# Patient Record
Sex: FEMALE | Race: WHITE
[De-identification: names, ages, dates, MRNs, and addresses within clinical notes are randomized per-mention and may not be internally consistent; named-entity substitution may affect disease eponyms.]

---

## 2023-03-17 ENCOUNTER — NON-APPOINTMENT (OUTPATIENT)
Age: 73
End: 2023-03-17

## 2024-09-15 ENCOUNTER — EMERGENCY (EMERGENCY)
Facility: HOSPITAL | Age: 74
LOS: 0 days | Discharge: HOME | End: 2024-09-15
Attending: EMERGENCY MEDICINE
Payer: MEDICARE

## 2024-09-15 VITALS
HEART RATE: 86 BPM | WEIGHT: 128.09 LBS | TEMPERATURE: 98 F | HEIGHT: 61 IN | OXYGEN SATURATION: 100 % | DIASTOLIC BLOOD PRESSURE: 92 MMHG | RESPIRATION RATE: 18 BRPM | SYSTOLIC BLOOD PRESSURE: 149 MMHG

## 2024-09-15 DIAGNOSIS — M54.50 LOW BACK PAIN, UNSPECIFIED: ICD-10-CM
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
## 2024-09-15 PROCEDURE — 99284 EMERGENCY DEPT VISIT MOD MDM: CPT | Mod: FS

## 2024-09-15 PROCEDURE — 99283 EMERGENCY DEPT VISIT LOW MDM: CPT

## 2024-09-15 RX ORDER — LIDOCAINE/BENZALKONIUM/ALCOHOL
1 SOLUTION, NON-ORAL TOPICAL
Qty: 5 | Refills: 0
Start: 2024-09-15 | End: 2024-09-21

## 2024-09-15 RX ORDER — ACETAMINOPHEN 325 MG/1
975 TABLET ORAL ONCE
Refills: 0 | Status: COMPLETED | OUTPATIENT
Start: 2024-09-15 | End: 2024-09-15

## 2024-09-15 RX ORDER — IBUPROFEN 600 MG
600 TABLET ORAL ONCE
Refills: 0 | Status: COMPLETED | OUTPATIENT
Start: 2024-09-15 | End: 2024-09-15

## 2024-09-15 RX ORDER — LIDOCAINE/BENZALKONIUM/ALCOHOL
1 SOLUTION, NON-ORAL TOPICAL ONCE
Refills: 0 | Status: COMPLETED | OUTPATIENT
Start: 2024-09-15 | End: 2024-09-15

## 2024-09-15 RX ADMIN — Medication 600 MILLIGRAM(S): at 11:40

## 2024-09-15 RX ADMIN — Medication 1 PATCH: at 11:40

## 2024-09-15 RX ADMIN — ACETAMINOPHEN 975 MILLIGRAM(S): 325 TABLET ORAL at 11:41

## 2024-09-15 NOTE — ED ADULT TRIAGE NOTE - MODE OF ARRIVAL
----- Message from Ami Robison sent at 12/19/2017  1:22 PM EST -----  Contact: LOTT  PATIENT REQUESTING A REFILL:    ADDERALL 30 MG   Walk in

## 2024-09-15 NOTE — ED PROVIDER NOTE - PHYSICAL EXAMINATION
CONST: Well appearing in NAD  EYES: PERRL, EOMI, Sclera and conjunctiva clear.   ENT: Oropharynx normal appearing, no erythema or exudates. Uvula midline.  CARD: Normal S1 S2; Normal rate and rhythm  RESP: Equal BS B/L, No wheezes, rhonchi or rales. No distress  GI: Soft, non-tender, non-distended.  MS: Normal ROM in all extremities. No midline spinal tenderness. R sided lumbosacral tenderness.   SKIN: Warm, dry, no acute rashes.   NEURO: A&Ox3, No focal deficits. Strength 5/5 with no sensory deficits. Steady gait

## 2024-09-15 NOTE — ED PROVIDER NOTE - OBJECTIVE STATEMENT
74-year-old female presents to the ED for evaluation.  Patient with mechanical nonsyncopal fall from standing 5 days ago fell onto the left side.  Had x-rays done at [facility] per patient was negative.  Patient complaining of right sided lumbosacral back pain worse with movement.  Has been taking 200 mg of ibuprofen without improvement.  Was concerned that her kidneys and may be injured prompting today's ED eval.  Denies any hematuria, nausea, vomiting, abdominal pain, numbness, tingling, weakness.

## 2024-09-15 NOTE — ED PROVIDER NOTE - ATTENDING APP SHARED VISIT CONTRIBUTION OF CARE
74-year-old female past medical history of chronic tremors, presents with fall 5 days ago and pain to right lower back.  Patient states she got up quickly and lost balance tripped landed on her back.  Patient states she was seen at urgent care and had x-rays done that were negative.  Symptoms worse with movement.  Taking 200 mg of ibuprofen with some relief.  No numbness weakness blurry vision slurred speech altered mental status.  No head injury or LOC.  No incontinence or retention.  No chest pain shortness of breath.  No abdominal pain nausea by diarrhea.  No blood thinners.  No dysuria frequency hematuria.  Patient concerned about her kidneys so came to ED.    On exam, AFVSS, Well appearing, No acute distress, NCAT, EOMI, PERRLA, MMM, Neck supple, LCTAB, RRR nl s1s2 No mrg, Abdomen Soft NTND, no CVA tenderness, aaox3, CN 2-12 intact, No nystagmus.  5/5 motor x 4 ext, SILT x 4 extremities, No facial droop or slurred speech. No pronator drift.  Normal rapid alternating movement and finger nose finger bilaterally. No midline C/T/L tenderness to palpation or step off. Normal gait, No ataxia.  Paraspinal right lower lumbar tenderness, no bruising or crepitus,, No LE edema or calf TTP,    A/P; musculoskeletal back pain neurovascular intact ambulatory in ED no red flags, improved with meds given in ED, DC home follow-up with PMD as outpatient 1 to 2 weeks, strict return precautions

## 2024-09-15 NOTE — ED ADULT NURSE NOTE - CCCP TRG CHIEF CMPLNT
Please notify patient: Vitamin D is still low, I already refilled the high dosage vitamin D to take weekly, with food    Insulin level is within normal limits  Thyroid function normal  FSH is normal, not at menopause.   Lipids within normal limits    Future Appointments  8/17/2022  9:00 AM    [facility] ULTRASOUND RM 1        [facility] US             [facility] Radiolog
fall

## 2024-09-15 NOTE — ED PROVIDER NOTE - PATIENT PORTAL LINK FT
You can access the FollowMyHealth Patient Portal offered by [facility] by registering at the following website: [email or web address]. By joining [employer]’s FollowMyHealth portal, you will also be able to view your health information using other applications (apps) compatible with our system.

## 2024-09-15 NOTE — ED ADULT NURSE NOTE - NSFALLRISKINTERV_ED_ALL_ED

## 2025-07-10 ENCOUNTER — EMERGENCY (EMERGENCY)
Facility: HOSPITAL | Age: 75
LOS: 0 days | Discharge: HOME | End: 2025-07-11
Attending: EMERGENCY MEDICINE
Payer: MEDICARE

## 2025-07-10 VITALS
DIASTOLIC BLOOD PRESSURE: 92 MMHG | HEIGHT: 61 IN | RESPIRATION RATE: 18 BRPM | SYSTOLIC BLOOD PRESSURE: 157 MMHG | OXYGEN SATURATION: 96 % | WEIGHT: 115.08 LBS | HEART RATE: 67 BPM | TEMPERATURE: 98 F

## 2025-07-10 PROCEDURE — 72125 CT NECK SPINE W/O DYE: CPT

## 2025-07-10 PROCEDURE — 70450 CT HEAD/BRAIN W/O DYE: CPT

## 2025-07-10 PROCEDURE — 99284 EMERGENCY DEPT VISIT MOD MDM: CPT | Mod: 25

## 2025-07-10 PROCEDURE — 99285 EMERGENCY DEPT VISIT HI MDM: CPT | Mod: FS

## 2025-07-10 RX ORDER — ACETAMINOPHEN 500 MG/5ML
650 LIQUID (ML) ORAL ONCE
Refills: 0 | Status: COMPLETED | OUTPATIENT
Start: 2025-07-10 | End: 2025-07-10

## 2025-07-10 RX ADMIN — Medication 650 MILLIGRAM(S): at 23:46

## 2025-07-10 NOTE — ED ADULT TRIAGE NOTE - CHIEF COMPLAINT QUOTE
The right coronary artery was selectively engaged and injected.  Pt c/o a mechanical fall down one stair immediately PTA. Pt states she hit the back of her head during the fall. +HT, -LOC, -AC.

## 2025-07-10 NOTE — ED ADULT NURSE NOTE - NSFALLHARMRISKINTERV_ED_ALL_ED

## 2025-07-10 NOTE — ED ADULT NURSE NOTE - CHIEF COMPLAINT QUOTE
Pt c/o a mechanical fall down one stair immediately PTA. Pt states she hit the back of her head during the fall. +HT, -LOC, -AC.

## 2025-07-11 PROCEDURE — 72125 CT NECK SPINE W/O DYE: CPT | Mod: 26

## 2025-07-11 PROCEDURE — 70450 CT HEAD/BRAIN W/O DYE: CPT | Mod: 26

## 2025-07-11 NOTE — ED PROVIDER NOTE - CARE PROVIDER_API CALL
Gianni Burr  Neurosurgery  [address] [place] [id number]  Phone: [phone]  Fax: [phone]  Follow Up Time:     Gretchen London  Neurological Surgery  [address] [place] [address], [address]  [place] [address]  Phone: [phone]  Fax: [phone]  Follow Up Time:

## 2025-07-11 NOTE — ED PROVIDER NOTE - CARE PROVIDERS DIRECT ADDRESSES
[email or web address]

## 2025-07-11 NOTE — ED PROVIDER NOTE - ATTENDING APP SHARED VISIT CONTRIBUTION OF CARE
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  Patient reports that she was walking upstairs and then decided to go downstairs to the bathroom and was walking backwards when she missed her footing on the left leg and fell hitting her posterior head.  Denies LOC.  Her daughter helped her eye.  Ambulatory since then.  Complaining of worsening neck pain and head pain at the site of the lump.  VSS, non toxic appearing, NAD, Head NCAT except for small hematoma, MMM, neck supple, with no midline tenderness to palpation, has some pressure marks on the right side of the neck, normal ROM, normal s1s2, lungs ctab, abd s/nt/nd, no guarding or rebound, extremities wnl, AAO x 3, GCS 15, neuro grossly normal. No acute skin lesions. Plan is head and neck imaging, analgesia and reassess.

## 2025-07-11 NOTE — ED PROVIDER NOTE - OBJECTIVE STATEMENT
74-year-old female history of essential tremor presenting to ER status post fall. patient's daughter states had mechanical trip and fall down 1 step fell backwards and hit back of head.  No LOC or anticoagulant use.  Patient has been ambulating at baseline since fall. + mild neck pain and pain to back of head. She denies any  other acute complaints in ER.  No headache, neck pain, nausea, vomiting, dizziness, unsteady gait, weakness, recent illness.

## 2025-07-11 NOTE — ED PROVIDER NOTE - CLINICAL SUMMARY MEDICAL DECISION MAKING FREE TEXT BOX
Patient presented after mechanical fall.  Required images and meds.  Was found to have spinous process fractures.  Neurosurgery consulted and they cleared patient for discharge with outpatient follow-up.

## 2025-07-11 NOTE — CONSULT NOTE ADULT - SUBJECTIVE AND OBJECTIVE BOX
Neurosurgery Consultation Note    74F PMH essential tremors s/p fall down x1 step,  Pt had a backyards fall down x1 step, hitting the back of her head, Pt remembers the entire event, no LOC no AC, since the fall pt has ambulated without difficulty In ED found with C4-6 SP Fx CTH negative.           Subjective: 74yFemale with a pmhx of MEWS Score    FALL AND HIT HEAD    90+    SysAdmin_VisitLink      s/p     Allergies    No Known Allergies    Intolerances        Vital Signs Last 24 Hrs  T(C): 36.7 (10 Jul 2025 22:36), Max: 36.7 (10 Jul 2025 22:36)  T(F): 98 (10 Jul 2025 22:36), Max: 98 (10 Jul 2025 22:36)  HR: 67 (10 Jul 2025 22:36) (67 - 67)  BP: 157/92 (10 Jul 2025 22:36) (157/92 - 157/92)  BP(mean): --  RR: 18 (10 Jul 2025 22:36) (18 - 18)  SpO2: 96% (10 Jul 2025 22:36) (96% - 96%)              REVIEW OF SYSTEMS    [ ] A ten-point review of systems was otherwise negative except as noted.  [ ] Due to altered mental status/intubation, subjective information were not able to be obtained from the patient. History was obtained, to the extent possible, from review of the chart and collateral sources of information.      Exam:  AAOX3. Verbal function intact  tongue midline, facial motions symmetric  PERRLA, EOMI  Pronator Drift:   Finger to Nose intact  Motor: MAEx4, 5/5 power in b/l UE and LE  Sensation: intact to touch in all extremities                        Wound:    Imaging:  < from: CT Head No Cont (07.11.25 @ 01:03) >    Impression:  CT cervical spine:  Acute displaced and nondisplaced C4-C6 spinous process fractures.  CT head:  No evidence of acute intracranial abnormality.    --- End of Report ---            WILFREDO SAUCEDO MD; Attending Radiologist  This document has been electronically signed. Jul 11 2025  1:41AM    < end of copied text >      Assessment/Plan:     74F PMH essential tremors s/p fall down x1 step found wiht C4-6 SP fx    Images reviewed, no acute neurosurgical intervention   Stable exam  Soft C collar PRN comfort  Pt may follow up with neurosurgery outpt       Neurosurgery Consultation Note    74F PMH essential tremors s/p fall down x1 step,  Pt had a backyards fall down x1 step, hitting the back of her head, Pt remembers the entire event, no LOC no AC, since the fall pt has ambulated without difficulty In ED found with C4-6 SP Fx CTH negative.  Pt seen and examined at the bedside, at present time pt is sitting up in bed, pt demonstrates an intact exam, PANIAGUA x4 SILT, w/o complaints, no C/T/L spinal TTP.          Subjective: 74yFemale with a pmhx of MEWS Score    FALL AND HIT HEAD    90+    SysAdmin_VisitLink      s/p     Allergies    No Known Allergies    Intolerances        Vital Signs Last 24 Hrs  T(C): 36.7 (10 Jul 2025 22:36), Max: 36.7 (10 Jul 2025 22:36)  T(F): 98 (10 Jul 2025 22:36), Max: 98 (10 Jul 2025 22:36)  HR: 67 (10 Jul 2025 22:36) (67 - 67)  BP: 157/92 (10 Jul 2025 22:36) (157/92 - 157/92)  BP(mean): --  RR: 18 (10 Jul 2025 22:36) (18 - 18)  SpO2: 96% (10 Jul 2025 22:36) (96% - 96%)              REVIEW OF SYSTEMS    [ ] A ten-point review of systems was otherwise negative except as noted.  [ ] Due to altered mental status/intubation, subjective information were not able to be obtained from the patient. History was obtained, to the extent possible, from review of the chart and collateral sources of information.      Exam:  AAOX3. Verbal function intact  tongue midline, facial motions symmetric  PERRLA, EOMI  Pronator Drift:   Finger to Nose intact  Motor: MAEx4, 5/5 power in b/l UE and LE  Sensation: intact to touch in all extremities                        Wound:    Imaging:  < from: CT Head No Cont (07.11.25 @ 01:03) >    Impression:  CT cervical spine:  Acute displaced and nondisplaced C4-C6 spinous process fractures.  CT head:  No evidence of acute intracranial abnormality.    --- End of Report ---            WILFREDO SAUCEDO MD; Attending Radiologist  This document has been electronically signed. Jul 11 2025  1:41AM    < end of copied text >      Assessment/Plan:     74F PMH essential tremors s/p fall down x1 step found with C4-6 SP fx    Images reviewed, no acute neurosurgical intervention   Stable exam  Pain control  Soft C collar PRN comfort  Pt may follow up PRN with established neurosurgeon Dr Gianni Burr ([facility])      Plan and care d/w ED team, pt and family updated

## 2025-07-11 NOTE — ED PROVIDER NOTE - NSFOLLOWUPINSTRUCTIONS_ED_ALL_ED_FT
Spinous Process Fracture    WHAT YOU NEED TO KNOW:    What is a spinous process fracture? A spinous process fracture is a break or crack in the back part of a vertebra. Muscles and ligaments in your back are attached to this part of the vertebra. When a vertebra is damaged, the spinal cord may also be damaged. Most spinous process fractures do not cause spinal cord damage.  Spinous Process Fracture    What increases my risk for a spinous process fracture?    Older age    Osteoporosis or low muscle mass    Not wearing a seatbelt in a car accident    Contact sports or a sport that causes you to twist your body    Shoveling something heavy, such as snow or rocks  What are the signs and symptoms of a spinous process fracture? Signs and symptoms depend on where the fracture happened and how severe it is:    Sudden, sharp, stabbing pain that may be severe or worse when you move or breathe    Dull pain that continues for several days or weeks    Swollen, numb, bruised, or tingling skin over the fracture area    Muscle weakness    Trouble moving your body where the fracture happened    Loss of bladder or bowel control  How is a spinous process fracture diagnosed? Your healthcare provider will examine you and ask about your symptoms. Tell him or her how the injury happened. You may also need any of the following:    X-rays may show a fracture and any injury to nearby muscles or tissues.    MRI or CT scan pictures may be taken if a fracture seems likely but does not show up on x-rays. Contrast liquid may be used to help a fracture or injury show up better in the pictures. Tell the healthcare provider if you have ever had an allergic reaction to contrast liquid. Do not enter the MRI room with anything metal. Metal can cause serious damage. Tell the provider if you have any metal in or on your body.  How is a spinous process fracture treated?    Medicine may be given or recommended to help with pain.    A back brace or cervical collar may be used to keep you from moving the area. The brace or collar will support the fracture area until it heals.    Surgery may be needed for the most serious fracture. Surgery is used to put pieces of bone back in place. The pieces may be held in place with screws or other medical hardware.  What can I do to manage my symptoms? The fracture may take weeks or months to heal. The following can help manage your symptoms:    Rest as directed. Rest will help the fracture heal. Avoid activities that can cause pain or more injury. Examples include sports or shoveling heavy objects. Begin normal, slow movements as directed by your healthcare provider. Your provider will tell you when it is okay to drive and to return to your normal activities.    Apply ice to help decrease swelling and pain. Use an ice pack, or put crushed ice in a plastic bag. Cover it with a towel before you apply it to your skin. Apply ice for 15 to 20 minutes every hour or as directed.    Go to physical therapy, if directed. A physical therapist teaches you exercises to help improve movement and strength, and to decrease pain.  What can I do to prevent more injury?    Strengthen your muscles and bones. Weight-bearing activities such as walking helps strengthen bones. Activities that help strengthen muscles, such as weightlifting, help protect your bones. Your healthcare provider can help you create a safe physical activity plan. He or she can also help you manage any condition that weakens your bones, such as osteoporosis.  Walking for Exercise  Strength Training for Adults      Shovel objects safely. Keep the shovel close to your body to lower stress on your back. Bend your knees and lift by straightening your legs. Do not lift with your arms only. Move the shovel from one hand to the other so you work different muscles. Try not to lift the shovel above your waist. Take breaks often. Stand up and straighten your spine.    Reach or maintain a healthy weight. Extra weight puts more stress on your neck and back. Your healthcare provider can help you create a safe weight loss plan, if needed.    Get more calcium and vitamin D, as directed. Calcium and vitamin D work together to make bones stronger. Good sources of calcium are milk, cheese, broccoli, tofu, almonds, and canned salmon or sardines. Vitamin D is in fish oils, some vegetables, and fortified milk, cereal, or bread. Vitamin D is also formed in the skin when it is exposed to the sun.        Lower your risk for injuries or accidents. Always wear a seatbelt when you are in a car or other vehicle. Wear proper protective gear when you play sports. Keep walkways in your home clear so you will not trip as you walk. Use handrails when you go up or down stairs.  Call your local emergency number (911 in the [place]) or have someone else call if:    You feel lightheaded, short of breath, or have chest pain.    You cough up blood.    You cannot move your arms or legs.  When should I seek immediate care?    Your arm or leg feels warm, tender, and painful. It may look swollen and red.    You have new problems urinating or having bowel movements.    You have severe pain.  When should I call my doctor?    You cannot sleep or rest because of pain.    You have pain or swelling that is getting worse or does not go away.    You have questions or concerns about your condition or care.  CARE AGREEMENT:    You have the right to help plan your care. Learn about your health condition and how it may be treated. Discuss treatment options with your healthcare providers to decide what care you want to receive. You always have the right to refuse treatment.

## 2025-07-11 NOTE — ED PROVIDER NOTE - PATIENT PORTAL LINK FT
You can access the FollowMyHealth Patient Portal offered by [facility] by registering at the following website: [email or web address]. By joining [employer]’s FollowMyHealth portal, you will also be able to view your health information using other applications (apps) compatible with our system.

## 2025-07-11 NOTE — ED PROVIDER NOTE - PHYSICAL EXAMINATION
CONSTITUTIONAL: Well-appearing; well-nourished; in no apparent distress.   EYES: PERRL; EOM intact.   ENT: normal nose; no rhinorrhea; normal pharynx with no tonsillar hypertrophy.   NECK: Supple; non-tender; no cervical lymphadenopathy.   CARDIOVASCULAR: Normal S1, S2; no murmurs, rubs, or gallops.  Equal radial pulses  RESPIRATORY: Normal chest excursion with respiration; breath sounds clear and equal bilaterally; no wheezes, rhonchi, or rales.  GI/: Normal bowel sounds; non-distended; non-tender; no palpable organomegaly.   MS: No evidence of trauma or deformity. Normal ROM in all four extremities; non-tender to palpation; distal pulses are normal.   SKIN: + small hematoma to posterior scalp  NEURO/PSYCH: A & O x 4; grossly unremarkable. mood and manner are appropriate. Grooming and personal hygiene are appropriate. No apparent thoughts of harm to self or others.